# Patient Record
Sex: FEMALE | Race: WHITE | NOT HISPANIC OR LATINO | Employment: FULL TIME | URBAN - METROPOLITAN AREA
[De-identification: names, ages, dates, MRNs, and addresses within clinical notes are randomized per-mention and may not be internally consistent; named-entity substitution may affect disease eponyms.]

---

## 2021-09-27 ENCOUNTER — OFFICE VISIT (OUTPATIENT)
Dept: URGENT CARE | Facility: CLINIC | Age: 51
End: 2021-09-27

## 2021-09-27 VITALS — RESPIRATION RATE: 16 BRPM | OXYGEN SATURATION: 98 % | HEART RATE: 84 BPM | TEMPERATURE: 98.7 F

## 2021-09-27 DIAGNOSIS — Z11.59 SPECIAL SCREENING EXAMINATION FOR UNSPECIFIED VIRAL DISEASE: Primary | ICD-10-CM

## 2021-09-27 PROCEDURE — U0003 INFECTIOUS AGENT DETECTION BY NUCLEIC ACID (DNA OR RNA); SEVERE ACUTE RESPIRATORY SYNDROME CORONAVIRUS 2 (SARS-COV-2) (CORONAVIRUS DISEASE [COVID-19]), AMPLIFIED PROBE TECHNIQUE, MAKING USE OF HIGH THROUGHPUT TECHNOLOGIES AS DESCRIBED BY CMS-2020-01-R: HCPCS | Performed by: PHYSICIAN ASSISTANT

## 2021-09-27 PROCEDURE — U0005 INFEC AGEN DETEC AMPLI PROBE: HCPCS | Performed by: PHYSICIAN ASSISTANT

## 2021-09-27 PROCEDURE — G0382 LEV 3 HOSP TYPE B ED VISIT: HCPCS | Performed by: PHYSICIAN ASSISTANT

## 2021-09-27 RX ORDER — PHENTERMINE HYDROCHLORIDE 15 MG/1
15 CAPSULE ORAL EVERY MORNING
COMMUNITY

## 2021-09-27 RX ORDER — NORETHINDRONE ACETATE AND ETHINYL ESTRADIOL AND FERROUS FUMARATE 1MG-20(24)
1 KIT ORAL DAILY
COMMUNITY

## 2021-09-27 NOTE — PROGRESS NOTES
NAME: Lubna Patel is a 46 y o  female  : 1970    MRN: [id number]      Assessment and Plan   Special screening examination for unspecified viral disease [Z11 59]  1  Special screening examination for unspecified viral disease  Novel Coron[facility] (Covid-19),[facility]        patient swab for COVID instructed to quarantine until results come back  Over-the-counter medications, fluids and rest   If no improvement and negative test follow-up with PCP  Sooner if anything changes or worsens  She acknowledges    Patient Instructions     Patient Instructions   COVID-19 Home Care Guidelines    Your healthcare provider and/or public health staff have evaluated you and have determined that you do not need to remain in the hospital at this time  At this time you can be isolated at home where you will be monitored by staff from your local or state health department  You should carefully follow the prevention and isolation steps below until a healthcare provider or local or state health department says that you can return to your normal activities  Stay home except to get medical care    People who are mildly ill with COVID-19 are able to isolate at home during their illness  You should restrict activities outside your home, except for getting medical care  Do not go to work, school, or public areas  Avoid using public transportation, ride-sharing, or taxis  Separate yourself from other people and animals in your home    People: As much as possible, you should stay in a specific room and away from other people in your home  Also, you should use a separate bathroom, if available  Animals: You should restrict contact with pets and other animals while you are sick with COVID-19, just like you would around other people   Although there have not been reports of pets or other animals becoming sick with COVID-19, it is still recommended that people sick with COVID-19 limit contact with animals until more information is known about the virus  When possible, have another member of your household care for your animals while you are sick  If you are sick with COVID-19, avoid contact with your pet, including petting, snuggling, being kissed or licked, and sharing food  If you must care for your pet or be around animals while you are sick, wash your hands before and after you interact with pets and wear a facemask  See COVID-19 and Animals for more information  Call ahead before visiting your doctor    If you have a medical appointment, call the healthcare provider and tell them that you have or may have COVID-19  This will help the healthcare providers office take steps to keep other people from getting infected or exposed  Wear a facemask    You should wear a facemask when you are around other people (e g , sharing a room or vehicle) or pets and before you enter a healthcare providers office  If you are not able to wear a facemask (for example, because it causes trouble breathing), then people who live with you should not stay in the same room with you, or they should wear a facemask if they enter your room  Cover your coughs and sneezes    Cover your mouth and nose with a tissue when you cough or sneeze  Throw used tissues in a lined trash can  Immediately wash your hands with soap and water for at least 20 seconds or, if soap and water are not available, clean your hands with an alcohol-based hand  that contains at least 60% alcohol  Clean your hands often    Wash your hands often with soap and water for at least 20 seconds, especially after blowing your nose, coughing, or sneezing; going to the bathroom; and before eating or preparing food  If soap and water are not readily available, use an alcohol-based hand  with at least 60% alcohol, covering all surfaces of your hands and rubbing them together until they feel dry    Soap and water are the best option if hands are visibly dirty  Avoid touching your eyes, nose, and mouth with unwashed hands  Avoid sharing personal household items    You should not share dishes, drinking glasses, cups, eating utensils, towels, or bedding with other people or pets in your home  After using these items, they should be washed thoroughly with soap and water  Clean all high-touch surfaces everyday    High touch surfaces include counters, tabletops, doorknobs, bathroom fixtures, toilets, phones, keyboards, tablets, and bedside tables  Also, clean any surfaces that may have blood, stool, or body fluids on them  Use a household cleaning spray or wipe, according to the label instructions  Labels contain instructions for safe and effective use of the cleaning product including precautions you should take when applying the product, such as wearing gloves and making sure you have good ventilation during use of the product  Monitor your symptoms    Seek prompt medical attention if your illness is worsening (e g , difficulty breathing)  Before seeking care, call your healthcare provider and tell them that you have, or are being evaluated for, COVID-19  Put on a facemask before you enter the facility  These steps will help the healthcare providers office to keep other people in the office or waiting room from getting infected or exposed  Ask your healthcare provider to call the local or state health department  Persons who are placed under active monitoring or facilitated self-monitoring should follow instructions provided by their local health department or occupational health professionals, as appropriate  If you have a medical emergency and need to call 911, notify the dispatch personnel that you have, or are being evaluated for COVID-19  If possible, put on a facemask before emergency medical services arrive      Discontinuing home isolation    Patients with confirmed COVID-19 should remain under home isolation precautions until the following conditions are met:   - They have had no fever for at least 24 hours (that is one full day of no fever without the use medicine that reduces fevers)  AND  - other symptoms have improved (for example, when their cough or shortness of breath have improved)  AND  - If had mild or moderate illness, at least 10 days have passed since their symptoms first appeared or if severe illness (needed oxygen) or immunosuppressed, at least 20 days have passed since symptoms first appeared  Patients with confirmed COVID-19 should also notify close contacts (including their workplace) and ask that they self-quarantine  Currently, close contact is defined as being within 6 feet for 15 minutes or more from the period 24 hours starting 48 hours before symptom onset to the time at which the patient went into isolation  Close contacts of patients diagnosed with COVID-19 should be instructed by the patient to self-quarantine for 14 days from the last time of their last contact with the patient  Source: [employer]      Proceed to ER if symptoms worsen  Chief Complaint     Chief Complaint   Patient presents with    Sinusitis     pressure and pain     Sore Throat         History of Present Illness    Patient with history of mild intermittent asthma presents complaining of sinus pain and pressure x2 days  States she has been having intermittent sinus pressure for the past 2 days  Also states she has been having a frontal headache  States she has also been having intermittent sore throat for the past 4 days  Reports today was the 1st time both for present at the same time  She denies any fevers but does admit to chills  Denies any chest pain, palpitations, shortness of breath or dyspnea  No nausea, vomiting or diarrhea  Is fully vaccinated for COVID  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    Has not been taking anything over-the-counter      Review of Systems Review of Systems   Constitutional: Positive for chills  Negative for fever  HENT: Positive for congestion, postnasal drip, rhinorrhea, sinus pressure, sinus pain and sore throat  Negative for trouble swallowing  Respiratory: Negative for cough, chest tightness, shortness of breath, wheezing and stridor  Cardiovascular: Negative for chest pain and palpitations  Gastrointestinal: Negative for abdominal pain, diarrhea, nausea and vomiting  Musculoskeletal: Negative for myalgias  Neurological: Negative for dizziness, weakness and headaches  Current Medications       Current Outpatient Medications:     norethindrone-ethinyl estradiol-ferrous fumarate (LOESTIN 24 FE) 1-20 MG-MCG(24) per tablet, Take 1 tablet by mouth daily, Disp: , Rfl:     phentermine 15 MG capsule, Take 15 mg by mouth every morning, Disp: , Rfl:     Current Allergies     Allergies as of 09/27/2021    (No Known Allergies)              No past medical history on file  No past surgical history on file  No family history on file  Medications have been verified  The following portions of the patient's history were reviewed and updated as appropriate: allergies, current medications, past family history, past medical history, past social history, past surgical history and problem list     Objective   Pulse 84   Temp 98 7 °F (37 1 °C)   Resp 16   SpO2 98%      Physical Exam     Physical Exam  Vitals and nursing note reviewed  Constitutional:       General: She is not in acute distress  Appearance: She is well-developed  She is not ill-appearing, toxic-appearing or diaphoretic  HENT:      Ears:      Comments: TMs clear bilaterally without erythema or bulging  Small amount of purulent fluid behind both  Posterior oropharynx is clear without erythema, edema, tonsillar hypertrophy or exudates  +Clear PND  Cardiovascular:      Rate and Rhythm: Normal rate and regular rhythm        Heart sounds: Normal heart sounds  Pulmonary:      Effort: Pulmonary effort is normal  No respiratory distress  Breath sounds: Normal breath sounds  No stridor  No wheezing, rhonchi or rales  Musculoskeletal:      Cervical back: Normal range of motion  Lymphadenopathy:      Cervical: No cervical adenopathy  Skin:     General: Skin is warm  Capillary Refill: Capillary refill takes less than 2 seconds  Neurological:      Mental Status: She is alert and oriented to person, place, and time

## 2021-09-28 LAB — SARS-COV-2 RNA RESP QL NAA+PROBE: NEGATIVE

## 2024-12-21 ENCOUNTER — OFFICE VISIT (OUTPATIENT)
Dept: URGENT CARE | Facility: CLINIC | Age: 54
End: 2024-12-21
Payer: COMMERCIAL

## 2024-12-21 VITALS
TEMPERATURE: 98 F | HEART RATE: 81 BPM | BODY MASS INDEX: 40.39 KG/M2 | OXYGEN SATURATION: 97 % | HEIGHT: 65 IN | RESPIRATION RATE: 18 BRPM | SYSTOLIC BLOOD PRESSURE: 140 MMHG | DIASTOLIC BLOOD PRESSURE: 80 MMHG | WEIGHT: 242.4 LBS

## 2024-12-21 DIAGNOSIS — B96.89 BACTERIAL URI: Primary | ICD-10-CM

## 2024-12-21 DIAGNOSIS — J06.9 BACTERIAL URI: Primary | ICD-10-CM

## 2024-12-21 DIAGNOSIS — J02.9 SORE THROAT: ICD-10-CM

## 2024-12-21 LAB — S PYO AG THROAT QL: NEGATIVE

## 2024-12-21 PROCEDURE — 87880 STREP A ASSAY W/OPTIC: CPT

## 2024-12-21 PROCEDURE — 99213 OFFICE O/P EST LOW 20 MIN: CPT

## 2024-12-21 RX ORDER — AZITHROMYCIN 250 MG/1
TABLET, FILM COATED ORAL
Qty: 6 TABLET | Refills: 0 | Status: SHIPPED | OUTPATIENT
Start: 2024-12-21 | End: 2024-12-25

## 2024-12-21 NOTE — PROGRESS NOTES
[facility]        NAME: Bindu Mix is a 54 y.o. female  : 1970    MRN: [id number]  DATE: 2024  TIME: 6:05 PM    Assessment and Plan   Bacterial URI [J06.9, B96.89]  1. Bacterial URI  azithromycin (ZITHROMAX) 250 mg tablet            Patient Instructions       Follow up with PCP in 3-5 days.  Proceed to  ER if symptoms worsen.    If tests have been performed at [facility], our office will contact you with results if changes need to be made to the care plan discussed with you at the visit.  You can review your full results on [facility].    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 Pt has been taking cough medicine & musinex for symptoms.          History of Present Illness       54-year-old female presents for worsening cold-like symptoms x 10 days.  Patient notes onset she developed cough, sore throat, fatigue, sinus pressure.  Had a virtual visit a few days later, uncertain of diagnosis, was given cough medication which does provide partial relief.  Since then continuation of sore throat, headache, fatigue.  Use Mucinex a few times.  Subjective fevers at home.    Cough  Associated symptoms include a fever, headaches and a sore throat. Pertinent negatives include no chills or ear pain.       Review of Systems   Review of Systems   Constitutional:  Positive for fever. Negative for chills.   HENT:  Positive for congestion, sinus pressure and sore throat. Negative for ear pain.    Respiratory:  Positive for cough.    Neurological:  Positive for headaches.         Current Medications       Current Outpatient Medications:     azithromycin (ZITHROMAX) 250 mg tablet, Take 2 tablets today then 1 tablet daily x 4 days, Disp: 6 tablet, Rfl: 0    norethindrone-ethinyl estradiol-ferrous  "fumarate (LOESTIN 24 FE) 1-20 MG-MCG(24) per tablet, Take 1 tablet by mouth daily (Patient not taking: Reported on 12/21/2024), Disp: , Rfl:     phentermine 15 MG capsule, Take 15 mg by mouth every morning (Patient not taking: Reported on 12/21/2024), Disp: , Rfl:     Current Allergies     Allergies as of 12/21/2024    (No Known Allergies)            The following portions of the patient's history were reviewed and updated as appropriate: allergies, current medications, past family history, past medical history, past social history, past surgical history and problem list.     No past medical history on file.    No past surgical history on file.    No family history on file.      Medications have been verified.        Objective   /80   Pulse 81   Temp 98 °F (36.7 °C)   Resp 18   Ht 5' 5\" (1.651 m)   Wt 110 kg (242 lb 6.4 oz)   SpO2 97%   BMI 40.34 kg/m²   No LMP recorded.       Physical Exam     Physical Exam  Vitals and nursing note reviewed.   Constitutional:       General: She is not in acute distress.     Appearance: She is not toxic-appearing.   HENT:      Head: Normocephalic and atraumatic.      Right Ear: Tympanic membrane, ear canal and external ear normal.      Left Ear: Tympanic membrane, ear canal and external ear normal.      Nose: Congestion present.      Mouth/Throat:      Mouth: Mucous membranes are moist.      Pharynx: No oropharyngeal exudate or posterior oropharyngeal erythema.   Eyes:      Conjunctiva/sclera: Conjunctivae normal.   Cardiovascular:      Rate and Rhythm: Normal rate and regular rhythm.   Pulmonary:      Effort: Pulmonary effort is normal.      Breath sounds: Normal breath sounds.   Lymphadenopathy:      Cervical: No cervical adenopathy.   Neurological:      Mental Status: She is alert.   Psychiatric:         Mood and Affect: Mood normal.         Behavior: Behavior normal.                   "

## 2025-04-30 ENCOUNTER — TELEPHONE (OUTPATIENT)
Age: 55
End: 2025-04-30

## 2025-04-30 NOTE — TELEPHONE ENCOUNTER
Patient called in and stated that she needed to schedule a Nerve study done.   I was unable to locate a referral or order to have this test done. I offered to give [facility] scheduling phone # but pt declined and she will call her PCP office to have them submit a referral before scheduling.

## 2025-07-03 ENCOUNTER — OFFICE VISIT (OUTPATIENT)
Dept: NEUROLOGY | Facility: CLINIC | Age: 55
End: 2025-07-03
Payer: COMMERCIAL

## 2025-07-03 VITALS
HEART RATE: 83 BPM | SYSTOLIC BLOOD PRESSURE: 108 MMHG | OXYGEN SATURATION: 98 % | DIASTOLIC BLOOD PRESSURE: 70 MMHG | WEIGHT: 210.3 LBS | HEIGHT: 65 IN | RESPIRATION RATE: 18 BRPM | BODY MASS INDEX: 35.04 KG/M2 | TEMPERATURE: 98.5 F

## 2025-07-03 DIAGNOSIS — R20.2 PARESTHESIA: Primary | ICD-10-CM

## 2025-07-03 PROCEDURE — 99203 OFFICE O/P NEW LOW 30 MIN: CPT | Performed by: NURSE PRACTITIONER

## 2025-07-03 RX ORDER — SEMAGLUTIDE 1.7 MG/.75ML
INJECTION, SOLUTION SUBCUTANEOUS
COMMUNITY
Start: 2025-01-01

## 2025-07-03 NOTE — PROGRESS NOTES
Name: Bindu Mix      : 1970      MRN: [id number]  Encounter Provider: DARCY Saleh  Encounter Date: 7/3/2025   Encounter department: NEUROLOGY [facility]  :  Assessment & Plan  Paresthesia  Patient with burning type discomfort in the left upper extremity that started approximately 6 months ago. this is mainly noted in the ulnar aspect of the arm from the elbow to the mid forearm.  She denies any symptoms in the hands or other areas of paresthesias or dysesthesias.  She does have diminished sensation in the ulnar distribution.  No muscle weakness noted.    Plan to obtain EMG to assess for an underlying ulnar neuropathy.  Will also obtain blood work for potential causes of paresthesias/dysesthesias.  Of note she did note symptoms started after she started Wegovy there may be related as this is listed as a potential side effect.    At this time she denies any need for medications for neuropathic pain.  She will contact the office if she notes any worsening symptoms, more specifically any worsening muscle weakness.    Follow up in 6 months. To contact the office sooner with any concerns or worsening symptoms.    Orders:    EMG 1 Limb; Future    Vitamin B12; Future    Vitamin B1, whole blood; Future    Vitamin B6; Future    Folate; Future    Sjogren's Antibodies; Future    Protein electrophoresis, serum; Future          History of Present Illness   HPI   Patient is a 55 year old female with PMH of asthma, sleep apnea. Avascular necrosis of both shoulders, prediabetes who presents for evaluation of burning pain in the LUE     About 6 months ago started with burning sensation in the ulnar aspect of the LUE. Starts around the elbow to the mid forearm. Progressive worsening over time.  Sensitive to touch, burning discomfort.  Intermittent but occurring more frequently over time. Lasts for a few seconds.  No clear trigger. Can wake her up from sleep.  No other numbness, tingling, burning  pain.   She does have shoulder issues so feels her arms aren't necessarily strong but no worsening weakness. No hand weakness.   No neck pain, no elbow pain.  No injury or trauma, 24 years ago she did have sun poisoning in this area.       She did start wegovy in the January-this is around when symptoms started, she has 30 lbs so far.       Review of Systems I have personally reviewed the MA's review of systems and made changes as necessary.         Objective   There were no vitals taken for this visit.    Physical Exam  Constitutional:       General: She is awake.   HENT:      Right Ear: Hearing normal.      Left Ear: Hearing normal.     Eyes:      General: Lids are normal.      Extraocular Movements: Extraocular movements intact.       Neurological:      Mental Status: She is alert.      Deep Tendon Reflexes:      Reflex Scores:       Bicep reflexes are 2+ on the right side and 2+ on the left side.       Brachioradialis reflexes are 2+ on the right side and 2+ on the left side.       Patellar reflexes are 2+ on the right side and 2+ on the left side.       Achilles reflexes are 2+ on the right side and 2+ on the left side.    Psychiatric:         Speech: Speech normal.       Neurological Exam  Mental Status  Awake and alert. Oriented to person, place, time and situation. Speech is normal. Language is fluent with no aphasia.    Cranial Nerves  CN III, IV, VI: Extraocular movements intact bilaterally. Normal lids and orbits bilaterally.  CN V:  Right: Facial sensation is normal.  Left: Facial sensation is normal on the left.  CN VII:  Right: There is no facial weakness.  Left: There is no facial weakness.  CN VIII:  Right: Hearing is normal.  Left: Hearing is normal.  CN IX, X: Palate elevates symmetrically  CN XI:  Right: Trapezius strength is normal.  Left: Trapezius strength is normal.  CN XII: Tongue midline without atrophy or fasciculations.    Motor  Normal muscle bulk throughout.                                                Right                     Left  Elbow flexion                         5                          5  Elbow extension                    5                          5  Wrist flexion                           5                          5  Wrist extension                      5                          5  Finger abduction                    5                          5  Hip flexion                              5                          5  Knee flexion                           5                          5  Knee extension                      5                          5  Ankle inversion                      5                          5  Ankle eversion                       5                          5  Plantarflexion                         5                          5  Dorsiflexion                            5                          5    Sensory  Light touch is normal in upper and lower extremities. Pinprick abnormality: Diminished in the ulnar distribution of the LUE otherwise normal . Temperature abnormality: Diminished in the ulnar distribution of the LUE otherwise normal   . Vibration is normal in upper and lower extremities. Proprioception is normal in upper and lower extremities.     Reflexes                                            Right                      Left  Brachioradialis                    2+                         2+  Biceps                                 2+                         2+  Patellar                                2+                         2+  Achilles                                2+                         2+  Right Plantar: downgoing  Left Plantar: downgoing    Right pathological reflexes: Elizabeth's absent. Ankle clonus absent.  Left pathological reflexes: Elizabeth's absent. Ankle clonus absent.    Gait  Casual gait is normal including stance, stride, and arm swing. Able to rise from chair without using arms.

## 2025-07-11 LAB
ALBUMIN SERPL ELPH-MCNC: 4.1 G/DL (ref 3.8–4.8)
ALPHA1 GLOB SERPL ELPH-MCNC: 0.3 G/DL (ref 0.2–0.3)
ALPHA2 GLOB SERPL ELPH-MCNC: 0.8 G/DL (ref 0.5–0.9)
BETA1 GLOB SERPL ELPH-MCNC: 0.5 G/DL (ref 0.4–0.6)
BETA2 GLOB SERPL ELPH-MCNC: 0.4 G/DL (ref 0.2–0.5)
ENA SS-A AB SER IA-ACNC: NORMAL AI
ENA SS-B AB SER IA-ACNC: NORMAL AI
FOLATE RBC-MCNC: 487 NG/ML RBC
GAMMA GLOB SERPL ELPH-MCNC: 1.2 G/DL (ref 0.8–1.7)
PROT SERPL-MCNC: 7.2 G/DL (ref 6.1–8.1)
VIT B1 BLD-SCNC: 112 NMOL/L (ref 78–185)
VIT B12 SERPL-MCNC: 271 PG/ML (ref 200–1100)
VIT B6 SERPL-MCNC: 11.1 NG/ML (ref 2.1–21.7)